# Patient Record
Sex: FEMALE | Race: WHITE | NOT HISPANIC OR LATINO
[De-identification: names, ages, dates, MRNs, and addresses within clinical notes are randomized per-mention and may not be internally consistent; named-entity substitution may affect disease eponyms.]

---

## 2020-04-22 ENCOUNTER — ASOB RESULT (OUTPATIENT)
Age: 25
End: 2020-04-22

## 2020-04-22 ENCOUNTER — OUTPATIENT (OUTPATIENT)
Dept: OUTPATIENT SERVICES | Facility: HOSPITAL | Age: 25
LOS: 1 days | Discharge: HOME | End: 2020-04-22

## 2020-04-22 ENCOUNTER — APPOINTMENT (OUTPATIENT)
Dept: ANTEPARTUM | Facility: CLINIC | Age: 25
End: 2020-04-22
Payer: MEDICAID

## 2020-04-22 PROBLEM — Z00.00 ENCOUNTER FOR PREVENTIVE HEALTH EXAMINATION: Status: ACTIVE | Noted: 2020-04-22

## 2020-04-22 PROCEDURE — 99211 OFF/OP EST MAY X REQ PHY/QHP: CPT | Mod: 25

## 2020-04-22 PROCEDURE — 76813 OB US NUCHAL MEAS 1 GEST: CPT | Mod: 26

## 2020-04-23 DIAGNOSIS — O35.9XX0 MATERNAL CARE FOR (SUSPECTED) FETAL ABNORMALITY AND DAMAGE, UNSPECIFIED, NOT APPLICABLE OR UNSPECIFIED: ICD-10-CM

## 2020-04-23 DIAGNOSIS — Z3A.11 11 WEEKS GESTATION OF PREGNANCY: ICD-10-CM

## 2020-04-23 DIAGNOSIS — Z36.89 ENCOUNTER FOR OTHER SPECIFIED ANTENATAL SCREENING: ICD-10-CM

## 2020-04-23 DIAGNOSIS — Z36.3 ENCOUNTER FOR ANTENATAL SCREENING FOR MALFORMATIONS: ICD-10-CM

## 2020-04-23 DIAGNOSIS — Z36.82 ENCOUNTER FOR ANTENATAL SCREENING FOR NUCHAL TRANSLUCENCY: ICD-10-CM

## 2020-06-17 ENCOUNTER — APPOINTMENT (OUTPATIENT)
Dept: ANTEPARTUM | Facility: CLINIC | Age: 25
End: 2020-06-17
Payer: MEDICAID

## 2020-06-17 ENCOUNTER — OUTPATIENT (OUTPATIENT)
Dept: OUTPATIENT SERVICES | Facility: HOSPITAL | Age: 25
LOS: 1 days | Discharge: HOME | End: 2020-06-17

## 2020-06-17 ENCOUNTER — ASOB RESULT (OUTPATIENT)
Age: 25
End: 2020-06-17

## 2020-06-17 DIAGNOSIS — O99.210 OBESITY COMPLICATING PREGNANCY, UNSPECIFIED TRIMESTER: ICD-10-CM

## 2020-06-17 DIAGNOSIS — Z36.3 ENCOUNTER FOR ANTENATAL SCREENING FOR MALFORMATIONS: ICD-10-CM

## 2020-06-17 DIAGNOSIS — Z36.89 ENCOUNTER FOR OTHER SPECIFIED ANTENATAL SCREENING: ICD-10-CM

## 2020-06-17 DIAGNOSIS — Z3A.19 19 WEEKS GESTATION OF PREGNANCY: ICD-10-CM

## 2020-06-17 DIAGNOSIS — O35.9XX0 MATERNAL CARE FOR (SUSPECTED) FETAL ABNORMALITY AND DAMAGE, UNSPECIFIED, NOT APPLICABLE OR UNSPECIFIED: ICD-10-CM

## 2020-06-17 PROCEDURE — 76805 OB US >/= 14 WKS SNGL FETUS: CPT | Mod: 26

## 2020-07-10 ENCOUNTER — OUTPATIENT (OUTPATIENT)
Dept: OUTPATIENT SERVICES | Facility: HOSPITAL | Age: 25
LOS: 1 days | Discharge: HOME | End: 2020-07-10

## 2020-07-10 ENCOUNTER — APPOINTMENT (OUTPATIENT)
Dept: ANTEPARTUM | Facility: CLINIC | Age: 25
End: 2020-07-10
Payer: MEDICAID

## 2020-07-10 ENCOUNTER — ASOB RESULT (OUTPATIENT)
Age: 25
End: 2020-07-10

## 2020-07-10 DIAGNOSIS — O99.210 OBESITY COMPLICATING PREGNANCY, UNSPECIFIED TRIMESTER: ICD-10-CM

## 2020-07-10 PROCEDURE — 76815 OB US LIMITED FETUS(S): CPT | Mod: 26

## 2020-07-13 DIAGNOSIS — Z3A.23 23 WEEKS GESTATION OF PREGNANCY: ICD-10-CM

## 2020-07-13 DIAGNOSIS — Z04.89 ENCOUNTER FOR EXAMINATION AND OBSERVATION FOR OTHER SPECIFIED REASONS: ICD-10-CM

## 2020-07-13 DIAGNOSIS — O99.210 OBESITY COMPLICATING PREGNANCY, UNSPECIFIED TRIMESTER: ICD-10-CM

## 2021-01-06 ENCOUNTER — INPATIENT (INPATIENT)
Facility: HOSPITAL | Age: 26
LOS: 1 days | Discharge: HOME | End: 2021-01-08
Attending: STUDENT IN AN ORGANIZED HEALTH CARE EDUCATION/TRAINING PROGRAM | Admitting: STUDENT IN AN ORGANIZED HEALTH CARE EDUCATION/TRAINING PROGRAM
Payer: MEDICAID

## 2021-01-06 VITALS
WEIGHT: 293 LBS | DIASTOLIC BLOOD PRESSURE: 73 MMHG | RESPIRATION RATE: 18 BRPM | HEART RATE: 104 BPM | TEMPERATURE: 98 F | HEIGHT: 65 IN | OXYGEN SATURATION: 98 % | SYSTOLIC BLOOD PRESSURE: 142 MMHG

## 2021-01-06 DIAGNOSIS — E04.1 NONTOXIC SINGLE THYROID NODULE: ICD-10-CM

## 2021-01-06 DIAGNOSIS — R91.1 SOLITARY PULMONARY NODULE: ICD-10-CM

## 2021-01-06 DIAGNOSIS — R55 SYNCOPE AND COLLAPSE: ICD-10-CM

## 2021-01-06 DIAGNOSIS — I48.91 UNSPECIFIED ATRIAL FIBRILLATION: ICD-10-CM

## 2021-01-06 LAB
ALBUMIN SERPL ELPH-MCNC: 4 G/DL — SIGNIFICANT CHANGE UP (ref 3.5–5.2)
ALP SERPL-CCNC: 71 U/L — SIGNIFICANT CHANGE UP (ref 30–115)
ALT FLD-CCNC: 26 U/L — SIGNIFICANT CHANGE UP (ref 0–41)
ANION GAP SERPL CALC-SCNC: 7 MMOL/L — SIGNIFICANT CHANGE UP (ref 7–14)
APTT BLD: 30.1 SEC — SIGNIFICANT CHANGE UP (ref 27–39.2)
AST SERPL-CCNC: 29 U/L — SIGNIFICANT CHANGE UP (ref 0–41)
BASOPHILS # BLD AUTO: 0.04 K/UL — SIGNIFICANT CHANGE UP (ref 0–0.2)
BASOPHILS NFR BLD AUTO: 0.4 % — SIGNIFICANT CHANGE UP (ref 0–1)
BILIRUB SERPL-MCNC: <0.2 MG/DL — SIGNIFICANT CHANGE UP (ref 0.2–1.2)
BUN SERPL-MCNC: 14 MG/DL — SIGNIFICANT CHANGE UP (ref 10–20)
CALCIUM SERPL-MCNC: 9.4 MG/DL — SIGNIFICANT CHANGE UP (ref 8.5–10.1)
CHLORIDE SERPL-SCNC: 104 MMOL/L — SIGNIFICANT CHANGE UP (ref 98–110)
CO2 SERPL-SCNC: 27 MMOL/L — SIGNIFICANT CHANGE UP (ref 17–32)
CREAT SERPL-MCNC: 0.8 MG/DL — SIGNIFICANT CHANGE UP (ref 0.7–1.5)
EOSINOPHIL # BLD AUTO: 0.09 K/UL — SIGNIFICANT CHANGE UP (ref 0–0.7)
EOSINOPHIL NFR BLD AUTO: 1 % — SIGNIFICANT CHANGE UP (ref 0–8)
GLUCOSE SERPL-MCNC: 94 MG/DL — SIGNIFICANT CHANGE UP (ref 70–99)
HCG SERPL QL: NEGATIVE — SIGNIFICANT CHANGE UP
HCT VFR BLD CALC: 39.4 % — SIGNIFICANT CHANGE UP (ref 37–47)
HGB BLD-MCNC: 12.8 G/DL — SIGNIFICANT CHANGE UP (ref 12–16)
IMM GRANULOCYTES NFR BLD AUTO: 0.3 % — SIGNIFICANT CHANGE UP (ref 0.1–0.3)
INR BLD: 1.05 RATIO — SIGNIFICANT CHANGE UP (ref 0.65–1.3)
LYMPHOCYTES # BLD AUTO: 2.25 K/UL — SIGNIFICANT CHANGE UP (ref 1.2–3.4)
LYMPHOCYTES # BLD AUTO: 25.1 % — SIGNIFICANT CHANGE UP (ref 20.5–51.1)
MAGNESIUM SERPL-MCNC: 2 MG/DL — SIGNIFICANT CHANGE UP (ref 1.8–2.4)
MCHC RBC-ENTMCNC: 27.5 PG — SIGNIFICANT CHANGE UP (ref 27–31)
MCHC RBC-ENTMCNC: 32.5 G/DL — SIGNIFICANT CHANGE UP (ref 32–37)
MCV RBC AUTO: 84.5 FL — SIGNIFICANT CHANGE UP (ref 81–99)
MONOCYTES # BLD AUTO: 0.53 K/UL — SIGNIFICANT CHANGE UP (ref 0.1–0.6)
MONOCYTES NFR BLD AUTO: 5.9 % — SIGNIFICANT CHANGE UP (ref 1.7–9.3)
NEUTROPHILS # BLD AUTO: 6.04 K/UL — SIGNIFICANT CHANGE UP (ref 1.4–6.5)
NEUTROPHILS NFR BLD AUTO: 67.3 % — SIGNIFICANT CHANGE UP (ref 42.2–75.2)
NRBC # BLD: 0 /100 WBCS — SIGNIFICANT CHANGE UP (ref 0–0)
NT-PROBNP SERPL-SCNC: 73 PG/ML — SIGNIFICANT CHANGE UP (ref 0–300)
PLATELET # BLD AUTO: 249 K/UL — SIGNIFICANT CHANGE UP (ref 130–400)
POTASSIUM SERPL-MCNC: 4.8 MMOL/L — SIGNIFICANT CHANGE UP (ref 3.5–5)
POTASSIUM SERPL-SCNC: 4.8 MMOL/L — SIGNIFICANT CHANGE UP (ref 3.5–5)
PROT SERPL-MCNC: 6.6 G/DL — SIGNIFICANT CHANGE UP (ref 6–8)
PROTHROM AB SERPL-ACNC: 12.1 SEC — SIGNIFICANT CHANGE UP (ref 9.95–12.87)
RAPID RVP RESULT: SIGNIFICANT CHANGE UP
RBC # BLD: 4.66 M/UL — SIGNIFICANT CHANGE UP (ref 4.2–5.4)
RBC # FLD: 12.5 % — SIGNIFICANT CHANGE UP (ref 11.5–14.5)
SARS-COV-2 RNA SPEC QL NAA+PROBE: SIGNIFICANT CHANGE UP
SODIUM SERPL-SCNC: 138 MMOL/L — SIGNIFICANT CHANGE UP (ref 135–146)
TROPONIN T SERPL-MCNC: <0.01 NG/ML — SIGNIFICANT CHANGE UP
WBC # BLD: 8.98 K/UL — SIGNIFICANT CHANGE UP (ref 4.8–10.8)
WBC # FLD AUTO: 8.98 K/UL — SIGNIFICANT CHANGE UP (ref 4.8–10.8)

## 2021-01-06 PROCEDURE — 99285 EMERGENCY DEPT VISIT HI MDM: CPT

## 2021-01-06 PROCEDURE — 71275 CT ANGIOGRAPHY CHEST: CPT | Mod: 26

## 2021-01-06 PROCEDURE — 99221 1ST HOSP IP/OBS SF/LOW 40: CPT

## 2021-01-06 PROCEDURE — 71045 X-RAY EXAM CHEST 1 VIEW: CPT | Mod: 26

## 2021-01-06 RX ORDER — ENOXAPARIN SODIUM 100 MG/ML
40 INJECTION SUBCUTANEOUS DAILY
Refills: 0 | Status: DISCONTINUED | OUTPATIENT
Start: 2021-01-07 | End: 2021-01-07

## 2021-01-06 RX ORDER — ASPIRIN/CALCIUM CARB/MAGNESIUM 324 MG
325 TABLET ORAL ONCE
Refills: 0 | Status: COMPLETED | OUTPATIENT
Start: 2021-01-06 | End: 2021-01-06

## 2021-01-06 RX ORDER — ASPIRIN/CALCIUM CARB/MAGNESIUM 324 MG
81 TABLET ORAL DAILY
Refills: 0 | Status: DISCONTINUED | OUTPATIENT
Start: 2021-01-06 | End: 2021-01-08

## 2021-01-06 RX ORDER — SODIUM CHLORIDE 9 MG/ML
1000 INJECTION, SOLUTION INTRAVENOUS ONCE
Refills: 0 | Status: COMPLETED | OUTPATIENT
Start: 2021-01-06 | End: 2021-01-06

## 2021-01-06 RX ADMIN — SODIUM CHLORIDE 1000 MILLILITER(S): 9 INJECTION, SOLUTION INTRAVENOUS at 22:10

## 2021-01-06 RX ADMIN — Medication 325 MILLIGRAM(S): at 22:17

## 2021-01-06 RX ADMIN — SODIUM CHLORIDE 1000 MILLILITER(S): 9 INJECTION, SOLUTION INTRAVENOUS at 19:18

## 2021-01-06 NOTE — H&P ADULT - NSHPLABSRESULTS_GEN_ALL_CORE
12.8   8.98  )-----------( 249      ( 06 Jan 2021 17:19 )             39.4     01-06    138  |  104  |  14  ----------------------------<  94  4.8   |  27  |  0.8    Ca    9.4      06 Jan 2021 17:19  Mg     2.0     01-06    TPro  6.6  /  Alb  4.0  /  TBili  <0.2  /  DBili  x   /  AST  29  /  ALT  26  /  AlkPhos  71  01-06            PT/INR - ( 06 Jan 2021 17:19 )   PT: 12.10 sec;   INR: 1.05 ratio         PTT - ( 06 Jan 2021 17:19 )  PTT:30.1 sec  Lactate Trend    CARDIAC MARKERS ( 06 Jan 2021 17:19 )  x     / <0.01 ng/mL / x     / x     / x          CAPILLARY BLOOD GLUCOSE      POCT Blood Glucose.: 104 mg/dL (06 Jan 2021 16:59) 12.8   8.98  )-----------( 249      ( 06 Jan 2021 17:19 )             39.4     01-06    138  |  104  |  14  ----------------------------<  94  4.8   |  27  |  0.8    Ca    9.4      06 Jan 2021 17:19  Mg     2.0     01-06    TPro  6.6  /  Alb  4.0  /  TBili  <0.2  /  DBili  x   /  AST  29  /  ALT  26  /  AlkPhos  71  01-06            PT/INR - ( 06 Jan 2021 17:19 )   PT: 12.10 sec;   INR: 1.05 ratio         PTT - ( 06 Jan 2021 17:19 )  PTT:30.1 sec  Lactate Trend    CARDIAC MARKERS ( 06 Jan 2021 17:19 )  x     / <0.01 ng/mL / x     / x     / x          CAPILLARY BLOOD GLUCOSE      POCT Blood Glucose.: 104 mg/dL (06 Jan 2021 16:59)    < from: CT Angio Chest PE Protocol w/ IV Cont (01.06.21 @ 18:39) >      EXAM:  CT ANGIO CHEST PE PROTOCOL IC            PROCEDURE DATE:  01/06/2021      < from: CT Angio Chest PE Protocol w/ IV Cont (01.06.21 @ 18:39) >    IMPRESSION:    No evidence of central pulmonary embolism. Evaluation of the segmental and subsegmental branches is limited even after 2 sets of images were obtained.    Indeterminate left thyroid nodule measuring 1.4 cm.    Partially imaged gallstones.    Left upper lobe 3 mm pulmonary nodule. Follow-up is recommended as indicated.    SARAH RICHARDSON M.D., RESIDENT RADIOLOGIST  This document has been electronically signed.  ANNA LECHUGA MD; Attending Radiologist  This document has been electronically signed. Jan 6 2021  8:09PM    < end of copied text >

## 2021-01-06 NOTE — ED ADULT NURSE NOTE - NSIMPLEMENTINTERV_GEN_ALL_ED
Implemented All Universal Safety Interventions:  [place] to call system. Call bell, personal items and telephone within reach. Instruct patient to call for assistance. Room bathroom lighting operational. Non-slip footwear when patient is off stretcher. Physically safe environment: no spills, clutter or unnecessary equipment. Stretcher in lowest position, wheels locked, appropriate side rails in place.

## 2021-01-06 NOTE — H&P ADULT - HISTORY OF PRESENT ILLNESS
26yo female who is 3 months post partum presents to ER following a syncopal episode. Apparently patient was awaken from sleep by a phone call. She stood up, got light headed and then had syncopal event. Denied chesp pain, shortness of breath or palpitations. ER work up found evidence of A Fib (she has no prior history of this)

## 2021-01-06 NOTE — ED PROVIDER NOTE - ATTENDING CONTRIBUTION TO CARE
25yF ~3mo postpartum p/w syncope - pt passed out and awoke on the floor of her home, +lump to the back of her head.  Now feels back to baseline.  No CP or SOB.  Pregnancy uncomplicated,  complicated by fever/?chorio, now still partially nursing.  No neck pain, headache, blurred vision or focal weakness/numbness.    CONSTITUTIONAL: well developed; well nourished; well appearing in no acute distress  HEAD: normocephalic; atraumatic  EYES: no conjunctival injection, no scleral icterus  ENT: no nasal discharge; airway clear.  NECK: supple; non tender. + full passive ROM in all directions  CARD: warm and well perfused, +tachycardic, irregular  RESP: breathing comfortably on RA, speaking in full sentences w/o distress  EXT: moving all extremities spontaneously, normal ROM. No clubbing, cyanosis  SKIN: warm and dry, no lesions noted  NEURO: alert, oriented, CN II-XII grossly intact, motor and sensory grossly intact, speech nonslurred, stable gait, no focal deficits. GCS 15  PSYCH: calm, cooperative, appropriate, good eye contact, logical thought process, no apparent danger to self or others

## 2021-01-06 NOTE — ED PROVIDER NOTE - OBJECTIVE STATEMENT
26 y/o female presents s/p syncopal episode today. patient was sleeping, awoke by phone ringing, stood up , felt lightheaded and had syncopal epiosde. patient denies any headache at this time. no dizziness , visual changes, tinnitus. no fever,chills. patient denies any cp, sob, arm pain. patient post partum 3 months ago. patient denies any leg swelling. patient denies any hemoptysis, palpitations, nausea. no change in diet, no birth control, no alcohol usage

## 2021-01-06 NOTE — H&P ADULT - PROBLEM SELECTOR PLAN 2
Telemetry with cardiology consult (Intensivist recommended no anti-coagulation) This is new onset (not chronic) and I expect this to be transient. For now telemetry with cardiology consult (Intensivist recommended no anti-coagulation)

## 2021-01-06 NOTE — ED PROVIDER NOTE - PROGRESS NOTE DETAILS
spoke with Dr. Carlton intensivitst who advises low risk tele for admission and advises ASA . patient rate controlled spoke with Dr. Carlton intensivitst who advises low risk tele for admission and advises ASA but no further anticoagulation. patient rate controlled

## 2021-01-06 NOTE — ED PROVIDER NOTE - PHYSICAL EXAMINATION
Vital Signs: I have reviewed the initial vital signs.  Constitutional: well-nourished, no acute distress, normocephalic  Eyes: PERRLA, EOMI, clear conjunctiva  ENT: MMM,  Cardiovascular: irregular rate, irregular rhythm, no murmur appreciated  Respiratory: unlabored respiratory effort, clear to auscultation bilaterally  Gastrointestinal: soft, non-tender, non-distended  abdomen,   Musculoskeletal: supple neck, no lower extremity edema, no bony tenderness  Integumentary: warm, dry, no rash  Neurologic: awake, alert, cranial nerves II-XII grossly intact, extremities’ motor and sensory functions grossly intact, no focal deficits

## 2021-01-06 NOTE — ED ADULT NURSE NOTE - NS ED NOTE  TALK SOMEONE YN
accuchecks with reg insulin coverage  HBA1C  Endo eval
1- Monitor Blood pressure closely.  2- Blood pressure control.  3- BP. meds as per cardiology and primary care team.
No

## 2021-01-06 NOTE — ED PROVIDER NOTE - CLINICAL SUMMARY MEDICAL DECISION MAKING FREE TEXT BOX
25yF ~3mo postpartum p/w syncope.  Pt hemodynamically stable, afebrile, comfortable and w/ nonfocal neuro exam.  EKG w/ afib, , presumably new onset.  Labs otherwise reassuring.  CTA chest w/o PE, though suboptimal views and unable to fully evaluate segmental/subsegmental branches.  Will give aspirin and adm for further care. 25yF ~3mo postpartum p/w syncope.  Pt hemodynamically stable, afebrile, comfortable and w/ nonfocal neuro exam.  EKG w/ afib, , presumably new onset.  Labs otherwise reassuring.  CTA chest w/o PE, though suboptimal views and unable to fully evaluate segmental/subsegmental branches.  Will give aspirin as per intensivist (who said no further a/c) and adm for further care.

## 2021-01-06 NOTE — ED PROVIDER NOTE - NS ED ROS FT
Review of Systems    Constitutional: (-) fever/ chills (-)loss of appetite   Eyes (-) visual changes  ENT: (-) epistaxis (-) sore throat (-) ear pain  Cardiovascular: (-) chest pain, (+) syncope (-) palpitations  Respiratory: (-) cough, (-) shortness of breath  Gastrointestinal: (-) vomiting, (-) diarrhea (-) abdominal pain  neck: (-) neck pain or stiffness  Musculoskeletal:  (-) back pain, (-) joint pain   Integumentary: (-) rash, (-) swelling  Neurological: (-) headache, (-) altered mental status

## 2021-01-07 DIAGNOSIS — R91.1 SOLITARY PULMONARY NODULE: ICD-10-CM

## 2021-01-07 DIAGNOSIS — I48.91 UNSPECIFIED ATRIAL FIBRILLATION: ICD-10-CM

## 2021-01-07 DIAGNOSIS — R55 SYNCOPE AND COLLAPSE: ICD-10-CM

## 2021-01-07 DIAGNOSIS — E04.1 NONTOXIC SINGLE THYROID NODULE: ICD-10-CM

## 2021-01-07 LAB
ALBUMIN SERPL ELPH-MCNC: 3.7 G/DL — SIGNIFICANT CHANGE UP (ref 3.5–5.2)
ALP SERPL-CCNC: 62 U/L — SIGNIFICANT CHANGE UP (ref 30–115)
ALT FLD-CCNC: 24 U/L — SIGNIFICANT CHANGE UP (ref 0–41)
ANION GAP SERPL CALC-SCNC: 10 MMOL/L — SIGNIFICANT CHANGE UP (ref 7–14)
AST SERPL-CCNC: 20 U/L — SIGNIFICANT CHANGE UP (ref 0–41)
BILIRUB SERPL-MCNC: 0.4 MG/DL — SIGNIFICANT CHANGE UP (ref 0.2–1.2)
BUN SERPL-MCNC: 13 MG/DL — SIGNIFICANT CHANGE UP (ref 10–20)
CALCIUM SERPL-MCNC: 9.3 MG/DL — SIGNIFICANT CHANGE UP (ref 8.5–10.1)
CHLORIDE SERPL-SCNC: 105 MMOL/L — SIGNIFICANT CHANGE UP (ref 98–110)
CK MB CFR SERPL CALC: 2.2 NG/ML — SIGNIFICANT CHANGE UP (ref 0.6–6.3)
CK SERPL-CCNC: 188 U/L — SIGNIFICANT CHANGE UP (ref 0–225)
CO2 SERPL-SCNC: 25 MMOL/L — SIGNIFICANT CHANGE UP (ref 17–32)
CREAT SERPL-MCNC: 0.8 MG/DL — SIGNIFICANT CHANGE UP (ref 0.7–1.5)
GLUCOSE SERPL-MCNC: 90 MG/DL — SIGNIFICANT CHANGE UP (ref 70–99)
HCT VFR BLD CALC: 38.8 % — SIGNIFICANT CHANGE UP (ref 37–47)
HGB BLD-MCNC: 12.4 G/DL — SIGNIFICANT CHANGE UP (ref 12–16)
MCHC RBC-ENTMCNC: 27.3 PG — SIGNIFICANT CHANGE UP (ref 27–31)
MCHC RBC-ENTMCNC: 32 G/DL — SIGNIFICANT CHANGE UP (ref 32–37)
MCV RBC AUTO: 85.5 FL — SIGNIFICANT CHANGE UP (ref 81–99)
NRBC # BLD: 0 /100 WBCS — SIGNIFICANT CHANGE UP (ref 0–0)
PLATELET # BLD AUTO: 246 K/UL — SIGNIFICANT CHANGE UP (ref 130–400)
POTASSIUM SERPL-MCNC: 4.1 MMOL/L — SIGNIFICANT CHANGE UP (ref 3.5–5)
POTASSIUM SERPL-SCNC: 4.1 MMOL/L — SIGNIFICANT CHANGE UP (ref 3.5–5)
PROT SERPL-MCNC: 6 G/DL — SIGNIFICANT CHANGE UP (ref 6–8)
RBC # BLD: 4.54 M/UL — SIGNIFICANT CHANGE UP (ref 4.2–5.4)
RBC # FLD: 12.5 % — SIGNIFICANT CHANGE UP (ref 11.5–14.5)
SODIUM SERPL-SCNC: 140 MMOL/L — SIGNIFICANT CHANGE UP (ref 135–146)
TROPONIN T SERPL-MCNC: <0.01 NG/ML — SIGNIFICANT CHANGE UP
TSH SERPL-MCNC: 1 UIU/ML — SIGNIFICANT CHANGE UP (ref 0.27–4.2)
WBC # BLD: 7.58 K/UL — SIGNIFICANT CHANGE UP (ref 4.8–10.8)
WBC # FLD AUTO: 7.58 K/UL — SIGNIFICANT CHANGE UP (ref 4.8–10.8)

## 2021-01-07 RX ORDER — IBUPROFEN 200 MG
400 TABLET ORAL ONCE
Refills: 0 | Status: COMPLETED | OUTPATIENT
Start: 2021-01-07 | End: 2021-01-07

## 2021-01-07 RX ADMIN — Medication 400 MILLIGRAM(S): at 03:20

## 2021-01-07 RX ADMIN — Medication 81 MILLIGRAM(S): at 12:11

## 2021-01-07 NOTE — CONSULT NOTE ADULT - ASSESSMENT
Patient got up very quickly to answer phone, Baby was asleep. Stood felt light headed. She had syncope. Probably secondary bp decreased. She developed aflutter. She also may have SARA. Out patient sleep study. Echo.Check thyroid. Can try pronestyl 300 over 30 minutes. Then 2mg a minute for up to 48 hours

## 2021-01-07 NOTE — CONSULT NOTE ADULT - SUBJECTIVE AND OBJECTIVE BOX
CARDIOLOGY CONSULT NOTE     CHIEF COMPLAINT/REASON FOR CONSULT:    HPI:  24yo female who is 3 months post partum presents to ER following a syncopal episode. Apparently patient was awaken from sleep by a phone call. She stood up, got light headed and then had syncopal event. Denied chesp pain, shortness of breath or palpitations. ER work up found  aflutter      PAST MEDICAL & SURGICAL HISTORY:  No pertinent past medical history    No significant past surgical history        Cardiac Risks:   [ ]HTN, [ ] DM, [ ] Smoking, [x ] FH,  [ ] Lipids        MEDICATIONS:  MEDICATIONS  (STANDING):  aspirin  chewable 81 milliGRAM(s) Oral daily      FAMILY HISTORY:  Family history unknown        SOCIAL HISTORY:      [ ] Marital status   Allergies    No Known Allergies        	    REVIEW OF SYSTEMS:  CONSTITUTIONAL: No fever, weight loss, or fatigue  EYES: No eye pain, visual disturbances, or discharge  ENMT:  No difficulty hearing, tinnitus, vertigo; No sinus or throat pain  NECK: No pain or stiffness  RESPIRATORY: No cough, wheezing, chills or hemoptysis; No Shortness of Breath  CARDIOVASCULAR:  See above  GASTROINTESTINAL: No abdominal or epigastric pain. No nausea, vomiting, or hematemesis; No diarrhea or constipation. No melena or hematochezia.  GENITOURINARY: No dysuria, frequency, hematuria, or incontinence  NEUROLOGICAL: No headaches, memory loss, loss of strength, numbness, or tremors  SKIN: No itching, burning, rashes, or lesions   	      PHYSICAL EXAM:  T(C): 36.3 (01-07-21 @ 04:50), Max: 36.8 (01-06-21 @ 17:03)  HR: 72 (01-07-21 @ 01:18) (72 - 104)  BP: 125/84 (01-07-21 @ 01:18) (125/84 - 142/73)  RR: 16 (01-07-21 @ 01:18) (16 - 18)  SpO2: 98% (01-06-21 @ 17:03) (98% - 98%)  Wt(kg): --  I&O's Summary      Appearance: Normal	  Psychiatry: A & O x 3, Mood & affect appropriate  HEENT:   Normal oral mucosa, PERRL, EOMI	  Lymphatic: No lymphadenopathy  Cardiovascular: Normal S1 S2,RRR, No JVD, No murmurs  Respiratory: Lungs clear to auscultation	  Gastrointestinal:  Soft, Non-tender, + BS	  Skin: No rashes, No ecchymoses, No cyanosis	  Neurologic: Non-focal  Extremities: Normal range of motion, No clubbing, cyanosis or edema  Vascular: Peripheral pulses palpable 2+ bilaterally      ECG:  	aflutter    	  LABS:	 	    CARDIAC MARKERS:          Serum Pro-Brain Natriuretic Peptide: 73 pg/mL (01-06 @ 18:38)                            12.4   7.58  )-----------( 246      ( 07 Jan 2021 06:19 )             38.8     01-07    140  |  105  |  13  ----------------------------<  90  4.1   |  25  |  0.8    Ca    9.3      07 Jan 2021 06:19  Mg     2.0     01-06    TPro  6.0  /  Alb  3.7  /  TBili  0.4  /  DBili  x   /  AST  20  /  ALT  24  /  AlkPhos  62  01-07    PT/INR - ( 06 Jan 2021 17:19 )   PT: 12.10 sec;   INR: 1.05 ratio         PTT - ( 06 Jan 2021 17:19 )  PTT:30.1 sec  proBNP: Serum Pro-Brain Natriuretic Peptide: 73 pg/mL (01-06 @ 18:38)

## 2021-01-08 ENCOUNTER — TRANSCRIPTION ENCOUNTER (OUTPATIENT)
Age: 26
End: 2021-01-08

## 2021-01-08 VITALS
DIASTOLIC BLOOD PRESSURE: 53 MMHG | SYSTOLIC BLOOD PRESSURE: 108 MMHG | TEMPERATURE: 97 F | HEART RATE: 81 BPM | RESPIRATION RATE: 16 BRPM

## 2021-01-08 LAB
SARS-COV-2 IGG SERPL QL IA: POSITIVE
SARS-COV-2 IGM SERPL IA-ACNC: 4.99 INDEX — HIGH

## 2021-01-08 PROCEDURE — 99239 HOSP IP/OBS DSCHRG MGMT >30: CPT

## 2021-01-08 RX ADMIN — Medication 81 MILLIGRAM(S): at 11:10

## 2021-01-08 NOTE — DISCHARGE NOTE PROVIDER - NSDCCPCAREPLAN_GEN_ALL_CORE_FT
PRINCIPAL DISCHARGE DIAGNOSIS  Diagnosis: Syncope  Assessment and Plan of Treatment: Felt to be Vaso-Vegal in nature, acute cardiaxc event ruled out      SECONDARY DISCHARGE DIAGNOSES  Diagnosis: Atrial fibrillation, new onset  Assessment and Plan of Treatment:      PRINCIPAL DISCHARGE DIAGNOSIS  Diagnosis: Syncope  Assessment and Plan of Treatment: Felt to be Vaso-Vegal in nature, acute cardiac event ruled out, cardiology recommends Sleep study as outpatient.      SECONDARY DISCHARGE DIAGNOSES  Diagnosis: Lung nodule  Assessment and Plan of Treatment: Patient to follow up with PMD for work up of lung nodule    Diagnosis: Thyroid nodule  Assessment and Plan of Treatment: Patient to follow up with PMD for work up of Thyroid nodule.    Diagnosis: Atrial fibrillation, new onset  Assessment and Plan of Treatment: There was  reported A Fib/Flutter event at admission: presently patient in NSR, TSH: WNL

## 2021-01-08 NOTE — DISCHARGE NOTE PROVIDER - NSDCFUADDINST_GEN_ALL_CORE_FT
This discharge and any meds discussed with Dr Nascimento This discharge and any meds discussed with Dr Nascimento.

## 2021-01-08 NOTE — DISCHARGE NOTE PROVIDER - CARE PROVIDER_API CALL
ANDREA RUBIO  Internal Medicine  [address] [place] [id number]  Phone: [phone]  Fax: [phone]  Follow Up Time: 1 week

## 2021-01-08 NOTE — DISCHARGE NOTE PROVIDER - HOSPITAL COURSE
ER Record:                      26yo female who is 3 months post partum presents to ER following a syncopal episode. Apparently patient was awaken from sleep by a phone call. She stood up, got light headed and then had syncopal event. Denied chest pain, shortness of breath or palpitations.    ECHO performed: ER Record:                      24yo female who is 3 months post partum presents to ER following a syncopal episode. Apparently patient was awaken from sleep by a phone call. She stood up, got light headed and then had syncopal event. Denied chest pain, shortness of breath or palpitations.    ECHO performed:     TSH 1.0  within normal limits ER Record:                      24yo female who is 3 months post partum presents to ER following a syncopal episode. Apparently patient was awaken from sleep by a phone call. She stood up, got light headed and then had syncopal event. Denied chest pain, shortness of breath or palpitations.    ECHO performed: with in normal limits as per Cardiac Technician    TSH 1.0  within normal limits 26yo female who is 3 months post partum presents to ER following a syncopal episode. Apparently patient was awaken from sleep by a phone call. She stood up, got light headed and then had syncopal event.   Denied chest pain, shortness of breath or palpitations. Patient admitted overnight. Labs remained unremarkable-troponins WNL. EKG demonstrated AFfib w/RVR but resolved spontaneoulsy and EKG demonstrated NSR. ECHO preliminary read was read back as "normal" by ECHO tech.       Of note, patient had incidental nodule on thyroid that was uncharacterized by imaging. TSH 1.0  within normal limits. It was recommended she follow up with PCP for ongoing work up of nodule. Additionally, found to have a nodule in the lungs at 3mm. She is not or ever was a smoker. Repeat Chest CT in 12months recommended. Will need to follow up with PCP.    Vital Signs Last 24 Hrs  T(C): 36.1 (08 Jan 2021 05:34), Max: 36.8 (07 Jan 2021 21:22)  T(F): 96.9 (08 Jan 2021 05:34), Max: 98.2 (07 Jan 2021 21:22)  HR: 81 (08 Jan 2021 05:34) (81 - 81)  BP: 108/53 (08 Jan 2021 05:34) (108/53 - 123/64)  BP(mean): --  RR: 16 (08 Jan 2021 05:34) (16 - 16)  SpO2: --    Physical Exam:  Gen- young F, NAD, non toxic, comfortable appearing  HEENT- NCAT, anicteric sclera, non injected conjunctiva, MMM  Neck- No JVP, nodule appreciated 1cm in left thyroid  Cardiac- RRR, normal s1s2, no RMG appreciated, no displaced PMI, or LE edema  Chest- CTAB, no wheezing, coarse breath sounds, or increased WOB  Abdomen- non distended, soft, non ttp, nabs+  Ext- no clubbing or cyanosis, spontaneously moving all 4 ext  Psych- "fine" mood, congruent to affect  Neuro-AOx3, normal mentation, CN 2-12 grossly intact  Uro- no everett in place      Radiology studies   1. CT Angio Chest PE Protocol w/ IV Cont (01.06.21 @ 18:39)   IMPRESSION:  No evidence of central pulmonary embolism. Evaluation of the segmental and subsegmental branches is limited even after 2 sets of images were obtained.  Indeterminate left thyroid nodule measuring 1.4 cm.  Partially imaged gallstones.  Left upper lobe 3 mm pulmonary nodule. Follow-up is recommended as indicated.    2. Xray Chest 1 View-PORTABLE IMMEDIATE (Xray Chest 1 View-PORTABLE IMMEDIATE .) (01.06.21 @ 17:58)   Impression:  No radiographic evidence of acute cardiopulmonary disease.

## 2021-01-08 NOTE — CHART NOTE - NSCHARTNOTEFT_GEN_A_CORE
Preliminary ECHO Report: within normal limits as per Technician  -Discussed with Dr Nascimento: can discharge patient to home

## 2021-01-08 NOTE — DISCHARGE NOTE NURSING/CASE MANAGEMENT/SOCIAL WORK - PATIENT PORTAL LINK FT
You can access the FollowMyHealth Patient Portal offered by [facility] by registering at the following website: [email or web address]. By joining [employer]’s FollowMyHealth portal, you will also be able to view your health information using other applications (apps) compatible with our system.

## 2021-08-20 PROBLEM — Z78.9 OTHER SPECIFIED HEALTH STATUS: Chronic | Status: ACTIVE | Noted: 2021-01-06

## 2021-09-27 ENCOUNTER — ASOB RESULT (OUTPATIENT)
Age: 26
End: 2021-09-27

## 2021-09-27 ENCOUNTER — OUTPATIENT (OUTPATIENT)
Dept: OUTPATIENT SERVICES | Facility: HOSPITAL | Age: 26
LOS: 1 days | Discharge: HOME | End: 2021-09-27

## 2021-09-27 ENCOUNTER — APPOINTMENT (OUTPATIENT)
Dept: ANTEPARTUM | Facility: CLINIC | Age: 26
End: 2021-09-27
Payer: MEDICAID

## 2021-09-27 PROCEDURE — 76811 OB US DETAILED SNGL FETUS: CPT | Mod: 26

## 2021-09-27 PROCEDURE — 99213 OFFICE O/P EST LOW 20 MIN: CPT

## 2021-09-27 PROCEDURE — 76817 TRANSVAGINAL US OBSTETRIC: CPT | Mod: 26

## 2021-09-30 DIAGNOSIS — O99.212 OBESITY COMPLICATING PREGNANCY, SECOND TRIMESTER: ICD-10-CM

## 2021-09-30 DIAGNOSIS — Z3A.21 21 WEEKS GESTATION OF PREGNANCY: ICD-10-CM

## 2021-09-30 DIAGNOSIS — Z36.3 ENCOUNTER FOR ANTENATAL SCREENING FOR MALFORMATIONS: ICD-10-CM

## 2021-12-13 ENCOUNTER — OUTPATIENT (OUTPATIENT)
Dept: OUTPATIENT SERVICES | Facility: HOSPITAL | Age: 26
LOS: 1 days | Discharge: HOME | End: 2021-12-13

## 2021-12-13 ENCOUNTER — ASOB RESULT (OUTPATIENT)
Age: 26
End: 2021-12-13

## 2021-12-13 ENCOUNTER — APPOINTMENT (OUTPATIENT)
Dept: ANTEPARTUM | Facility: CLINIC | Age: 26
End: 2021-12-13
Payer: MEDICAID

## 2021-12-13 PROCEDURE — 76816 OB US FOLLOW-UP PER FETUS: CPT | Mod: 26

## 2021-12-14 DIAGNOSIS — Z3A.32 32 WEEKS GESTATION OF PREGNANCY: ICD-10-CM

## 2021-12-14 DIAGNOSIS — O36.60X0 MATERNAL CARE FOR EXCESSIVE FETAL GROWTH, UNSPECIFIED TRIMESTER, NOT APPLICABLE OR UNSPECIFIED: ICD-10-CM

## 2021-12-14 DIAGNOSIS — O99.213 OBESITY COMPLICATING PREGNANCY, THIRD TRIMESTER: ICD-10-CM

## 2022-01-03 ENCOUNTER — APPOINTMENT (OUTPATIENT)
Dept: ANTEPARTUM | Facility: CLINIC | Age: 27
End: 2022-01-03

## 2022-01-10 ENCOUNTER — ASOB RESULT (OUTPATIENT)
Age: 27
End: 2022-01-10

## 2022-01-10 ENCOUNTER — APPOINTMENT (OUTPATIENT)
Dept: ANTEPARTUM | Facility: CLINIC | Age: 27
End: 2022-01-10
Payer: MEDICAID

## 2022-01-10 ENCOUNTER — OUTPATIENT (OUTPATIENT)
Dept: OUTPATIENT SERVICES | Facility: HOSPITAL | Age: 27
LOS: 1 days | Discharge: HOME | End: 2022-01-10

## 2022-01-10 PROCEDURE — 76819 FETAL BIOPHYS PROFIL W/O NST: CPT | Mod: 26

## 2022-01-10 PROCEDURE — 76816 OB US FOLLOW-UP PER FETUS: CPT | Mod: 26

## 2022-01-18 ENCOUNTER — APPOINTMENT (OUTPATIENT)
Dept: ANTEPARTUM | Facility: CLINIC | Age: 27
End: 2022-01-18

## 2022-01-24 ENCOUNTER — APPOINTMENT (OUTPATIENT)
Dept: ANTEPARTUM | Facility: CLINIC | Age: 27
End: 2022-01-24

## 2022-01-28 NOTE — ED PROVIDER NOTE - HIV OFFER
information as submitted by the pharmacies.    This report was requested by: Kavon Carbajal | Reference #: [id number]    Others' Prescriptions  Patient Name: Kenna SwanBirth Date: 11/13/1928  Address: [place], [place] [address]Rlj: Female  Rx Written	Rx Dispensed	Drug	Quantity	Days Supply	Prescriber Name	Prescriber Shira #	Payment Method	Dispenser  01/24/2022	01/24/2022	oxycodone hcl (ir) 5 mg tablet	30	7	Lashay Riggs	[id number]	Other	[id number]  * - Drugs marked with an asterisk are compound drugs. If the compound drug is made up of more than one controlled substance, then each controlled substance will be a separate row in the table. Opt out

## 2022-01-31 ENCOUNTER — APPOINTMENT (OUTPATIENT)
Dept: ANTEPARTUM | Facility: CLINIC | Age: 27
End: 2022-01-31

## 2022-02-07 ENCOUNTER — APPOINTMENT (OUTPATIENT)
Dept: ANTEPARTUM | Facility: CLINIC | Age: 27
End: 2022-02-07

## 2023-08-01 NOTE — H&P ADULT - NSICDXPASTSURGICALHX_GEN_ALL_CORE_FT
Detail Level: Detailed Detail Level: Simple Detail Level: Zone PAST SURGICAL HISTORY:  No significant past surgical history